# Patient Record
Sex: FEMALE | Race: WHITE
[De-identification: names, ages, dates, MRNs, and addresses within clinical notes are randomized per-mention and may not be internally consistent; named-entity substitution may affect disease eponyms.]

---
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
## 2024-01-08 NOTE — RAD
INDICATION:  Right lower quadrant pain.



COMPARISON:  There are no prior studies available for comparison.



TECHNIQUE:  Multiple real-time transvaginal images of the pelvis were obtained.



FINDINGS: The uterus is normal in size, shape and echogenicity.  The uterus measured 6.3 x

3.3 x 4.0 cm.  The endometrial echo measured 1.1 cm in thickness. There are small cystic

areas within the endometrium.



The right ovary measured 2.7 x 2.0 x 2.2 cm.  The left ovary measured 3.1 x 2.2 x 1.3 cm.

There is vascular flow within both ovaries.



There is a trace amount of free intraperitoneal fluid in the cul-de-sac.



IMPRESSION:  

1. NO EVIDENCE FOR OVARIAN TORSION.

2. SMALL CYSTIC AREAS IN THE ENDOMETRIUM. RECOMMEND A FOLLOW-UP TRANSVAGINAL PELVIC

ULTRASOUND IN 1-2 MONTHS TIME.
INDICATION: Right lower quadrant pain     



COMPARISON: None



TECHNIQUE: Transverse and longitudinal scans of the right lower quadrant were performed

utilizing grayscale and color Doppler imaging.



FINDINGS: There is nonvisualization of the appendix. No free fluid or masses identified.



IMPRESSION: THE APPENDIX IS NOT VISUALIZED. SUGGEST CLINICAL MANAGEMENT IF THERE IS

CONCERN OF ACUTE APPENDICITIS TO INCLUDE SURGICAL REFERRAL AS INDICATED
Indication: RIGHT upper quadrant abdominal pain.



Comparison: No relevant prior exams available on the [facility] PACS for comparison.



Technique: RIGHT upper quadrant ultrasound.



Report: Appropriate direction flow documented in the portal and hepatic veins.



14.2 cm liver is normal in echogenicity. Negative for focal hepatic lesions. Negative for

intrahepatic biliary dilatation. 4.2 mm common bile duct.



The gallbladder is packed with stones with resulting wall echo shadow complex. The

gallbladder wall measures 5.5 mm. No definitive pericholecystic fluid evident. Positive

for sonographic Oneal's sign. 



The pancreatic tail is partially obscured due to bowel gas with the visualized pancreas

unremarkable.



Negative for ascites.



9.2 x 3.9 x 3.9 cm RIGHT kidney is unremarkable. 



IMPRESSION: Cholelithiasis and gallbladder wall thickening with positive sonographic

Oneal's sign concerning for acute cholecystitis. Negative for biliary dilatation.

Correlate with clinical assessment.



Results discussed with HETAL Licona in the ED at 12/29/2017 3:56 PM EST
no